# Patient Record
Sex: FEMALE | Race: WHITE | NOT HISPANIC OR LATINO | Employment: OTHER | URBAN - METROPOLITAN AREA
[De-identification: names, ages, dates, MRNs, and addresses within clinical notes are randomized per-mention and may not be internally consistent; named-entity substitution may affect disease eponyms.]

---

## 2023-11-15 ENCOUNTER — APPOINTMENT (OUTPATIENT)
Dept: HEMATOLOGY/ONCOLOGY | Facility: HOSPITAL | Age: 68
End: 2023-11-15
Payer: MEDICARE

## 2024-01-04 ENCOUNTER — TELEPHONE (OUTPATIENT)
Dept: HEMATOLOGY/ONCOLOGY | Facility: HOSPITAL | Age: 69
End: 2024-01-04
Payer: MEDICARE

## 2024-01-04 ENCOUNTER — ANCILLARY PROCEDURE (OUTPATIENT)
Dept: RADIOLOGY | Facility: CLINIC | Age: 69
End: 2024-01-04
Payer: MEDICARE

## 2024-01-04 DIAGNOSIS — C50.919 MALIGNANT NEOPLASM OF UNSPECIFIED SITE OF UNSPECIFIED FEMALE BREAST (MULTI): ICD-10-CM

## 2024-01-04 PROCEDURE — G0279 TOMOSYNTHESIS, MAMMO: HCPCS | Performed by: RADIOLOGY

## 2024-01-04 PROCEDURE — 77066 DX MAMMO INCL CAD BI: CPT | Performed by: RADIOLOGY

## 2024-01-04 PROCEDURE — 77062 BREAST TOMOSYNTHESIS BI: CPT

## 2024-01-04 NOTE — TELEPHONE ENCOUNTER
Called patient to review mammogram results. If patient returns call, please inform mammogram results are normal and recommendation is to repeat in 1 year.

## 2024-01-04 NOTE — TELEPHONE ENCOUNTER
Pt returned call- notified mamm results were normal and recommendation is to repeat in one year. No further questions.

## 2024-03-21 ENCOUNTER — APPOINTMENT (OUTPATIENT)
Dept: SURGICAL ONCOLOGY | Facility: CLINIC | Age: 69
End: 2024-03-21
Payer: MEDICARE

## 2024-04-09 ENCOUNTER — OFFICE VISIT (OUTPATIENT)
Dept: PRIMARY CARE | Facility: CLINIC | Age: 69
End: 2024-04-09
Payer: MEDICARE

## 2024-04-09 VITALS
OXYGEN SATURATION: 95 % | BODY MASS INDEX: 40.25 KG/M2 | SYSTOLIC BLOOD PRESSURE: 136 MMHG | DIASTOLIC BLOOD PRESSURE: 80 MMHG | TEMPERATURE: 98.6 F | HEIGHT: 65 IN | WEIGHT: 241.6 LBS | HEART RATE: 60 BPM

## 2024-04-09 DIAGNOSIS — M85.80 OSTEOPENIA DETERMINED BY X-RAY: ICD-10-CM

## 2024-04-09 DIAGNOSIS — K76.89 LIVER CYST: ICD-10-CM

## 2024-04-09 DIAGNOSIS — Z11.59 NEED FOR HEPATITIS C SCREENING TEST: ICD-10-CM

## 2024-04-09 DIAGNOSIS — Z78.0 POST-MENOPAUSAL: ICD-10-CM

## 2024-04-09 DIAGNOSIS — E78.00 HYPERCHOLESTEROLEMIA: ICD-10-CM

## 2024-04-09 DIAGNOSIS — I50.32 CHRONIC DIASTOLIC HEART FAILURE (MULTI): ICD-10-CM

## 2024-04-09 DIAGNOSIS — I48.19 PERSISTENT ATRIAL FIBRILLATION (MULTI): ICD-10-CM

## 2024-04-09 DIAGNOSIS — Z76.89 ENCOUNTER TO ESTABLISH CARE WITH NEW DOCTOR: Primary | ICD-10-CM

## 2024-04-09 DIAGNOSIS — R53.82 CHRONIC FATIGUE: ICD-10-CM

## 2024-04-09 DIAGNOSIS — R73.03 PREDIABETES: ICD-10-CM

## 2024-04-09 DIAGNOSIS — M79.10 MYALGIA: ICD-10-CM

## 2024-04-09 PROBLEM — I10 HYPERTENSION: Status: ACTIVE | Noted: 2024-04-09

## 2024-04-09 PROBLEM — I25.118 ATHEROSCLEROTIC HEART DISEASE OF NATIVE CORONARY ARTERY WITH OTHER FORMS OF ANGINA PECTORIS (CMS-HCC): Status: ACTIVE | Noted: 2024-04-09

## 2024-04-09 PROBLEM — K82.8 SLUDGE IN GALLBLADDER: Status: RESOLVED | Noted: 2023-08-08 | Resolved: 2024-04-09

## 2024-04-09 PROBLEM — Z96.659 HISTORY OF TOTAL KNEE REPLACEMENT: Status: RESOLVED | Noted: 2019-10-21 | Resolved: 2024-04-09

## 2024-04-09 PROBLEM — Z86.010 HISTORY OF COLON POLYPS: Status: RESOLVED | Noted: 2024-04-09 | Resolved: 2024-04-09

## 2024-04-09 PROBLEM — K82.4 GALL BLADDER POLYP: Status: RESOLVED | Noted: 2024-04-09 | Resolved: 2024-04-09

## 2024-04-09 PROBLEM — Z86.0100 HISTORY OF COLON POLYPS: Status: RESOLVED | Noted: 2024-04-09 | Resolved: 2024-04-09

## 2024-04-09 PROCEDURE — 99214 OFFICE O/P EST MOD 30 MIN: CPT | Performed by: FAMILY MEDICINE

## 2024-04-09 PROCEDURE — 1160F RVW MEDS BY RX/DR IN RCRD: CPT | Performed by: FAMILY MEDICINE

## 2024-04-09 PROCEDURE — 3079F DIAST BP 80-89 MM HG: CPT | Performed by: FAMILY MEDICINE

## 2024-04-09 PROCEDURE — 3075F SYST BP GE 130 - 139MM HG: CPT | Performed by: FAMILY MEDICINE

## 2024-04-09 PROCEDURE — 99204 OFFICE O/P NEW MOD 45 MIN: CPT | Performed by: FAMILY MEDICINE

## 2024-04-09 PROCEDURE — 3008F BODY MASS INDEX DOCD: CPT | Performed by: FAMILY MEDICINE

## 2024-04-09 PROCEDURE — 1159F MED LIST DOCD IN RCRD: CPT | Performed by: FAMILY MEDICINE

## 2024-04-09 PROCEDURE — 1126F AMNT PAIN NOTED NONE PRSNT: CPT | Performed by: FAMILY MEDICINE

## 2024-04-09 RX ORDER — POTASSIUM CHLORIDE 20 MEQ/1
20 TABLET, EXTENDED RELEASE ORAL
COMMUNITY

## 2024-04-09 RX ORDER — FUROSEMIDE 20 MG/1
20 TABLET ORAL
COMMUNITY
Start: 2023-07-05

## 2024-04-09 RX ORDER — ANASTROZOLE 1 MG/1
1 TABLET ORAL DAILY
COMMUNITY

## 2024-04-09 RX ORDER — RIVAROXABAN 20 MG/1
20 TABLET, FILM COATED ORAL
COMMUNITY

## 2024-04-09 RX ORDER — SIMVASTATIN 20 MG/1
20 TABLET, FILM COATED ORAL NIGHTLY
Qty: 90 TABLET | Refills: 0 | Status: SHIPPED | OUTPATIENT
Start: 2024-04-09 | End: 2024-06-05 | Stop reason: SDUPTHER

## 2024-04-09 RX ORDER — ACETAMINOPHEN 500 MG
5000 TABLET ORAL
COMMUNITY
End: 2024-06-05 | Stop reason: ALTCHOICE

## 2024-04-09 RX ORDER — DOFETILIDE 0.25 MG/1
500 CAPSULE ORAL 2 TIMES DAILY
COMMUNITY

## 2024-04-09 RX ORDER — ATORVASTATIN CALCIUM 20 MG/1
20 TABLET, FILM COATED ORAL NIGHTLY
COMMUNITY
Start: 2023-06-01 | End: 2024-04-09 | Stop reason: SINTOL

## 2024-04-09 ASSESSMENT — LIFESTYLE VARIABLES
SKIP TO QUESTIONS 9-10: 1
HOW OFTEN DO YOU HAVE SIX OR MORE DRINKS ON ONE OCCASION: NEVER
AUDIT-C TOTAL SCORE: 1
HOW OFTEN DO YOU HAVE A DRINK CONTAINING ALCOHOL: MONTHLY OR LESS
HOW MANY STANDARD DRINKS CONTAINING ALCOHOL DO YOU HAVE ON A TYPICAL DAY: 1 OR 2

## 2024-04-09 ASSESSMENT — PATIENT HEALTH QUESTIONNAIRE - PHQ9
2. FEELING DOWN, DEPRESSED OR HOPELESS: NOT AT ALL
SUM OF ALL RESPONSES TO PHQ9 QUESTIONS 1 AND 2: 0
1. LITTLE INTEREST OR PLEASURE IN DOING THINGS: NOT AT ALL

## 2024-04-09 ASSESSMENT — PAIN SCALES - GENERAL: PAINLEVEL: 0-NO PAIN

## 2024-04-09 NOTE — PATIENT INSTRUCTIONS
Problem List Items Addressed This Visit             ICD-10-CM    Chronic diastolic heart failure (CMS/HCC) I50.32    Relevant Orders    Referral to Cardiology    Hypercholesterolemia E78.00    Relevant Medications    simvastatin (Zocor) 20 mg tablet    Other Relevant Orders    Lipid Panel    Comprehensive Metabolic Panel    Persistent atrial fibrillation (CMS/HCC) I48.19    Relevant Orders    Referral to Cardiology    Prediabetes R73.03    Relevant Orders    Insulin, Fasting    Lipid Panel    Comprehensive Metabolic Panel     Other Visit Diagnoses         Codes    Encounter to establish care with new doctor    -  Primary Z76.89    Liver cyst     K76.89    Relevant Orders    Referral to Gastroenterology    Comprehensive Metabolic Panel    Osteopenia determined by x-ray     M85.80    Relevant Orders    XR DEXA bone density    Post-menopausal     Z78.0    Relevant Orders    XR DEXA bone density    Myalgia     M79.10    Relevant Orders    Vitamin D 25-Hydroxy,Total (for eval of Vitamin D levels)    Vitamin B12    Chronic fatigue     R53.82    Relevant Orders    Vitamin D 25-Hydroxy,Total (for eval of Vitamin D levels)    Vitamin B12    Body mass index (BMI) 40.0-44.9, adult (CMS/[facility])     Z68.41    Relevant Orders    Vitamin D 25-Hydroxy,Total (for eval of Vitamin D levels)    Need for hepatitis C screening test     Z11.59    Relevant Orders    Hepatitis C antibody            Additional Visit Plans:  Plan to stop atorvastatin in favor of trying simvastatin as you were getting some cramping on atorvastatin. Recheck fasting labs in 6-8 weeks. Lets see how your cholesterol dose. Focus on a low fat diet with regular exercise. Will also check fasting insulin level as I suspect yours might be high.     Plan to transition to cardiology in our system.     Plan to see Dr. Garcia, the GI doctor, about your liver cyst/history thereof and future scoping.     Next Wellness Exam/Annual Physical Due  At your earliest  convenience.    Patient Care Team:  Ayden Yanez DO as PCP - General (Family Medicine)    Ayden Yanez DO   04/09/24   10:54 AM

## 2024-04-09 NOTE — PROGRESS NOTES
"         Outpatient Visit Note    Chief Complaint   Patient presents with    Establish Care     NP EST Care- Medication refills       HPI:  Betsey Flor is a 68 y.o. female here to establish care and go over her medications.    Previous PCP is from [facility]. Moving to the  system.     She sees cardiology and heme-onc. Has chronic Afib, s/p 3x ablations and several cardioversions. On Tokisyn now.     History of breast cancer, surgically removed 2 years ago. Ductal carcinoma. Has radiation. Sees Dr. Hernandez and Medical Oncology at . Follows with them every 6 months.    Liver cyst that was \"deroofed\" when she had her gallbladder removed by the surgeon. Does not see GI. No more abdominal pain.     Prediabetes with glucose control concerns. No Fhx diabetes. On Atorvastatin. A1c was 5.7% last month. Was out of her cholesterol medicine for 6 weeks when she did her labs.     PHQ9/GAD7:         Past Medical History:   Diagnosis Date    Cancer (CMS/HCC)     Gall bladder polyp 04/09/2024    Heart disease     History of colon polyps 04/09/2024    History of radiation therapy     left breast    History of total knee replacement 10/21/2019    Liver cyst 10/20/2020    Last Assessment & Plan: Formatting of this note might be different from the original. Assessment:Was told at time of echo she had a large liver cyst but it was \" nothing to worry about \" however she did have dedicated liver US recently ,results not known        Current Medications  Current Outpatient Medications   Medication Instructions    anastrozole (ARIMIDEX) 1 mg, oral, Daily    BIOTIN ORAL 1 tablet, oral, Daily    CALCIUM ORAL 1 tablet, oral, Daily    cholecalciferol (VITAMIN D-3) 5,000 Units, oral    dofetilide (TIKOSYN) 500 mcg, oral, 2 times daily    furosemide (LASIX) 20 mg, oral, Daily RT    potassium chloride CR 20 mEq ER tablet 20 mEq, oral, Every 48 hours    simvastatin (ZOCOR) 20 mg, oral, Nightly    Xarelto 20 mg, oral, Daily with evening meal    "     Allergies  No Known Allergies     Past Surgical History:   Procedure Laterality Date    BREAST SURGERY      CHOLECYSTECTOMY      HYSTERECTOMY      OTHER SURGICAL HISTORY  10/08/2019    Knee replacement    OTHER SURGICAL HISTORY  10/08/2019    Knee arthroscopy     Family History   Problem Relation Name Age of Onset    Atrial fibrillation Mother My dad     Cancer Mother My dad     Ovarian cancer Mother My dad      Social History     Tobacco Use    Smoking status: Never    Smokeless tobacco: Never   Vaping Use    Vaping status: Never Used   Substance Use Topics    Alcohol use: Not Currently     Comment: socially    Drug use: Never     Tobacco Use: Low Risk  (4/9/2024)    Patient History     Smoking Tobacco Use: Never     Smokeless Tobacco Use: Never     Passive Exposure: Not on file        ROS  All pertinent positive symptoms are included in the history of present illness.  All other systems have been reviewed and are negative and noncontributory to this patient's current ailments.    VITAL SIGNS  Vitals:    04/09/24 1021   BP: 136/80   Pulse: 60   Temp: 37 °C (98.6 °F)   SpO2: 95%     Vitals:    04/09/24 1021   Weight: 110 kg (241 lb 9.6 oz)      Body mass index is 40.2 kg/m².     PHYSICAL EXAM  GENERAL APPEARANCE: well nourished, well developed, looks like stated age, in no acute distress, not ill or tired appearing, conversing well.   HEENT: no trauma, normocephalic.   NECK: supple without rigidity, no neck mass was observed.   LUNGS: good chest wall expansion. In no acute respiratory distress.   EXTREMITIES: moving all extremities equally with no edema.   SKIN: normal skin color and pigmentation, without rash.   NEUROLOGIC EXAM: CN II-XII grossly intact, normal gait.   PSYCH: mood and affect appropriate; alert and oriented to time, place, person; no difficulty with speech or language.     Counseling:       Medication education:           Education:  The patient is counseled regarding potential side-effects of  all new medications          Understanding:  Patient expressed understanding of information conveyed today          Adherence:  No barriers to adherence identified     Assessment/Plan   Problem List Items Addressed This Visit             ICD-10-CM    Chronic diastolic heart failure (CMS/[facility]) I50.32    Relevant Orders    Referral to Cardiology    Hypercholesterolemia E78.00    Relevant Medications    simvastatin (Zocor) 20 mg tablet    Other Relevant Orders    Lipid Panel    Comprehensive Metabolic Panel    Persistent atrial fibrillation (CMS/[facility]) I48.19    Relevant Orders    Referral to Cardiology    Prediabetes R73.03    Relevant Orders    Insulin, Fasting    Lipid Panel    Comprehensive Metabolic Panel     Other Visit Diagnoses         Codes    Encounter to establish care with new doctor    -  Primary Z76.89    Liver cyst     K76.89    Relevant Orders    Referral to Gastroenterology    Comprehensive Metabolic Panel    Osteopenia determined by x-ray     M85.80    Relevant Orders    XR DEXA bone density    Post-menopausal     Z78.0    Relevant Orders    XR DEXA bone density    Myalgia     M79.10    Relevant Orders    Vitamin D 25-Hydroxy,Total (for eval of Vitamin D levels)    Vitamin B12    Chronic fatigue     R53.82    Relevant Orders    Vitamin D 25-Hydroxy,Total (for eval of Vitamin D levels)    Vitamin B12    Body mass index (BMI) 40.0-44.9, adult (CMS/[facility])     Z68.41    Relevant Orders    Vitamin D 25-Hydroxy,Total (for eval of Vitamin D levels)    Need for hepatitis C screening test     Z11.59    Relevant Orders    Hepatitis C antibody            Additional Visit Plans:  Plan to stop atorvastatin in favor of trying simvastatin as you were getting some cramping on atorvastatin. Recheck fasting labs in 6-8 weeks. Lets see how your cholesterol dose. Focus on a low fat diet with regular exercise. Will also check fasting insulin level as I suspect yours might be high.     Plan to transition to cardiology in our  system.     Plan to see Dr. Garcia, the GI doctor, about your liver cyst/history thereof and future scoping.     Next Wellness Exam/Annual Physical Due  At your earliest convenience.    Patient Care Team:  Ayden Yanez DO as PCP - General (Family Medicine)    Ayden Yanez DO   04/11/24   7:47 AM

## 2024-04-15 ENCOUNTER — APPOINTMENT (OUTPATIENT)
Dept: RADIOLOGY | Facility: CLINIC | Age: 69
End: 2024-04-15
Payer: MEDICARE

## 2024-05-03 ENCOUNTER — HOSPITAL ENCOUNTER (OUTPATIENT)
Dept: RADIOLOGY | Facility: HOSPITAL | Age: 69
Discharge: HOME | End: 2024-05-03
Payer: MEDICARE

## 2024-05-03 DIAGNOSIS — K52.9 NONINFECTIVE GASTROENTERITIS AND COLITIS, UNSPECIFIED: ICD-10-CM

## 2024-05-03 PROCEDURE — 76705 ECHO EXAM OF ABDOMEN: CPT

## 2024-05-03 PROCEDURE — 76705 ECHO EXAM OF ABDOMEN: CPT | Performed by: STUDENT IN AN ORGANIZED HEALTH CARE EDUCATION/TRAINING PROGRAM

## 2024-05-07 ENCOUNTER — HOSPITAL ENCOUNTER (OUTPATIENT)
Dept: RADIOLOGY | Facility: HOSPITAL | Age: 69
Discharge: HOME | End: 2024-05-07
Payer: MEDICARE

## 2024-05-07 ENCOUNTER — LAB (OUTPATIENT)
Dept: LAB | Facility: LAB | Age: 69
End: 2024-05-07
Payer: MEDICARE

## 2024-05-07 DIAGNOSIS — E78.00 HYPERCHOLESTEROLEMIA: ICD-10-CM

## 2024-05-07 DIAGNOSIS — R53.82 CHRONIC FATIGUE: ICD-10-CM

## 2024-05-07 DIAGNOSIS — Z11.59 NEED FOR HEPATITIS C SCREENING TEST: ICD-10-CM

## 2024-05-07 DIAGNOSIS — M79.10 MYALGIA: ICD-10-CM

## 2024-05-07 DIAGNOSIS — Z78.0 POST-MENOPAUSAL: ICD-10-CM

## 2024-05-07 DIAGNOSIS — K76.89 LIVER CYST: ICD-10-CM

## 2024-05-07 DIAGNOSIS — R73.03 PREDIABETES: ICD-10-CM

## 2024-05-07 DIAGNOSIS — M85.80 OSTEOPENIA DETERMINED BY X-RAY: ICD-10-CM

## 2024-05-07 LAB
25(OH)D3 SERPL-MCNC: 22 NG/ML (ref 31–100)
ALBUMIN SERPL-MCNC: 4.4 G/DL (ref 3.5–5)
ALP BLD-CCNC: 67 U/L (ref 35–125)
ALT SERPL-CCNC: 23 U/L (ref 5–40)
ANION GAP SERPL CALC-SCNC: 12 MMOL/L
AST SERPL-CCNC: 20 U/L (ref 5–40)
BILIRUB SERPL-MCNC: 0.7 MG/DL (ref 0.1–1.2)
BUN SERPL-MCNC: 11 MG/DL (ref 8–25)
CALCIUM SERPL-MCNC: 9.2 MG/DL (ref 8.5–10.4)
CHLORIDE SERPL-SCNC: 105 MMOL/L (ref 97–107)
CHOLEST SERPL-MCNC: 145 MG/DL (ref 133–200)
CHOLEST/HDLC SERPL: 3 {RATIO}
CO2 SERPL-SCNC: 26 MMOL/L (ref 24–31)
CREAT SERPL-MCNC: 0.7 MG/DL (ref 0.4–1.6)
EGFRCR SERPLBLD CKD-EPI 2021: >90 ML/MIN/1.73M*2
GLUCOSE SERPL-MCNC: 95 MG/DL (ref 65–99)
HCV AB SER QL: NONREACTIVE
HDLC SERPL-MCNC: 49 MG/DL
INSULIN P FAST SERPL-ACNC: 20 UIU/ML (ref 3–25)
LDLC SERPL CALC-MCNC: 73 MG/DL (ref 65–130)
POTASSIUM SERPL-SCNC: 4.4 MMOL/L (ref 3.4–5.1)
PROT SERPL-MCNC: 6.7 G/DL (ref 5.9–7.9)
SODIUM SERPL-SCNC: 143 MMOL/L (ref 133–145)
TRIGL SERPL-MCNC: 116 MG/DL (ref 40–150)
VIT B12 SERPL-MCNC: 599 PG/ML (ref 211–946)

## 2024-05-07 PROCEDURE — 77080 DXA BONE DENSITY AXIAL: CPT

## 2024-05-07 PROCEDURE — 80061 LIPID PANEL: CPT

## 2024-05-07 PROCEDURE — 36415 COLL VENOUS BLD VENIPUNCTURE: CPT

## 2024-05-07 PROCEDURE — 83525 ASSAY OF INSULIN: CPT

## 2024-05-07 PROCEDURE — 86803 HEPATITIS C AB TEST: CPT

## 2024-05-07 PROCEDURE — 82306 VITAMIN D 25 HYDROXY: CPT

## 2024-05-07 PROCEDURE — 80053 COMPREHEN METABOLIC PANEL: CPT

## 2024-05-07 PROCEDURE — 82607 VITAMIN B-12: CPT

## 2024-05-21 ENCOUNTER — APPOINTMENT (OUTPATIENT)
Dept: PRIMARY CARE | Facility: CLINIC | Age: 69
End: 2024-05-21
Payer: MEDICARE

## 2024-05-28 DIAGNOSIS — E88.819 INSULIN RESISTANCE: ICD-10-CM

## 2024-05-28 DIAGNOSIS — E55.9 VITAMIN D DEFICIENCY: Primary | ICD-10-CM

## 2024-05-28 RX ORDER — CHOLECALCIFEROL (VITAMIN D3) 1250 MCG
50000 TABLET ORAL
Qty: 12 TABLET | Refills: 0 | Status: SHIPPED | OUTPATIENT
Start: 2024-06-02 | End: 2024-08-31

## 2024-06-05 ENCOUNTER — OFFICE VISIT (OUTPATIENT)
Dept: PRIMARY CARE | Facility: CLINIC | Age: 69
End: 2024-06-05
Payer: MEDICARE

## 2024-06-05 VITALS
TEMPERATURE: 97.9 F | OXYGEN SATURATION: 95 % | SYSTOLIC BLOOD PRESSURE: 136 MMHG | WEIGHT: 235.4 LBS | HEIGHT: 64 IN | DIASTOLIC BLOOD PRESSURE: 86 MMHG | BODY MASS INDEX: 40.19 KG/M2 | RESPIRATION RATE: 18 BRPM | HEART RATE: 67 BPM

## 2024-06-05 DIAGNOSIS — Z00.00 MEDICARE ANNUAL WELLNESS VISIT, SUBSEQUENT: Primary | ICD-10-CM

## 2024-06-05 DIAGNOSIS — Z12.31 BREAST CANCER SCREENING BY MAMMOGRAM: ICD-10-CM

## 2024-06-05 DIAGNOSIS — E78.00 HYPERCHOLESTEROLEMIA: ICD-10-CM

## 2024-06-05 DIAGNOSIS — R73.03 PREDIABETES: ICD-10-CM

## 2024-06-05 LAB — POC HEMOGLOBIN A1C: 5.6 % (ref 4.2–6.5)

## 2024-06-05 PROCEDURE — G0439 PPPS, SUBSEQ VISIT: HCPCS | Performed by: FAMILY MEDICINE

## 2024-06-05 PROCEDURE — 1126F AMNT PAIN NOTED NONE PRSNT: CPT | Performed by: FAMILY MEDICINE

## 2024-06-05 PROCEDURE — 1158F ADVNC CARE PLAN TLK DOCD: CPT | Performed by: FAMILY MEDICINE

## 2024-06-05 PROCEDURE — 99213 OFFICE O/P EST LOW 20 MIN: CPT | Performed by: FAMILY MEDICINE

## 2024-06-05 PROCEDURE — 83036 HEMOGLOBIN GLYCOSYLATED A1C: CPT | Mod: MUE | Performed by: FAMILY MEDICINE

## 2024-06-05 PROCEDURE — 3008F BODY MASS INDEX DOCD: CPT | Performed by: FAMILY MEDICINE

## 2024-06-05 PROCEDURE — 1123F ACP DISCUSS/DSCN MKR DOCD: CPT | Performed by: FAMILY MEDICINE

## 2024-06-05 PROCEDURE — 1170F FXNL STATUS ASSESSED: CPT | Performed by: FAMILY MEDICINE

## 2024-06-05 PROCEDURE — 3075F SYST BP GE 130 - 139MM HG: CPT | Performed by: FAMILY MEDICINE

## 2024-06-05 PROCEDURE — 1160F RVW MEDS BY RX/DR IN RCRD: CPT | Performed by: FAMILY MEDICINE

## 2024-06-05 PROCEDURE — 99215 OFFICE O/P EST HI 40 MIN: CPT | Performed by: FAMILY MEDICINE

## 2024-06-05 PROCEDURE — 3079F DIAST BP 80-89 MM HG: CPT | Performed by: FAMILY MEDICINE

## 2024-06-05 PROCEDURE — 1159F MED LIST DOCD IN RCRD: CPT | Performed by: FAMILY MEDICINE

## 2024-06-05 RX ORDER — COLESEVELAM 180 1/1
1250 TABLET ORAL 2 TIMES DAILY
COMMUNITY
Start: 2024-05-29 | End: 2024-06-28

## 2024-06-05 RX ORDER — SIMVASTATIN 20 MG/1
20 TABLET, FILM COATED ORAL NIGHTLY
Qty: 90 TABLET | Refills: 1 | Status: SHIPPED | OUTPATIENT
Start: 2024-06-05 | End: 2024-12-02

## 2024-06-05 ASSESSMENT — ENCOUNTER SYMPTOMS
LOSS OF SENSATION IN FEET: 0
OCCASIONAL FEELINGS OF UNSTEADINESS: 0
DEPRESSION: 0

## 2024-06-05 ASSESSMENT — PATIENT HEALTH QUESTIONNAIRE - PHQ9
SUM OF ALL RESPONSES TO PHQ9 QUESTIONS 1 AND 2: 0
1. LITTLE INTEREST OR PLEASURE IN DOING THINGS: NOT AT ALL
2. FEELING DOWN, DEPRESSED OR HOPELESS: NOT AT ALL

## 2024-06-05 ASSESSMENT — ACTIVITIES OF DAILY LIVING (ADL)
DRESSING: INDEPENDENT
BATHING: INDEPENDENT

## 2024-06-05 ASSESSMENT — COLUMBIA-SUICIDE SEVERITY RATING SCALE - C-SSRS
2. HAVE YOU ACTUALLY HAD ANY THOUGHTS OF KILLING YOURSELF?: NO
1. IN THE PAST MONTH, HAVE YOU WISHED YOU WERE DEAD OR WISHED YOU COULD GO TO SLEEP AND NOT WAKE UP?: NO
6. HAVE YOU EVER DONE ANYTHING, STARTED TO DO ANYTHING, OR PREPARED TO DO ANYTHING TO END YOUR LIFE?: NO

## 2024-06-05 ASSESSMENT — PAIN SCALES - GENERAL: PAINLEVEL: 0-NO PAIN

## 2024-06-05 NOTE — PROGRESS NOTES
Outpatient Visit Note    Chief Complaint   Patient presents with    Annual Exam       HPI:  Betsey Flor is a 68 y.o. female here  for a Medicare Wellness Visit. Also needs a refill on her medicines.     No side effects or cramping on Simvastatin. Had labs done. Needs refill.     Health Maintenance  Immunizations: Tdap is due 2027.  Pneumonia vaccine is up-to-date.  Shingles vaccination is due.    Denies smoking or illicit drug use, drinks 0 alcoholic beverages a week. Patient reports routine vision checks and dental cleanings, and regular exercise with going to the gym for water aerobics.  She recently had routine blood work done in preparation for today's visit.  Is on vitamin D supplementation now    Screening colonoscopy done in 2023, having another one done next month. Screening pap N/A. Screening mammogram is due soon. DEXA done recently, next due 2026    Otherwise denies fevers, chills, cold/flu symptoms, SOB, CP, N/V, abdominal pain, dysuria, hematuria, melena, diarrhea or LE edema     Advanced directives: in place, not on file here    Living Will: Not Received    Healthcare Power of Atty: Not Received     Hearing screen: reports no difficulty with hearing and passes finger rub test bilaterally    Does the patient use opioid medications: No    How does the patient rate their health status today: good    Cognitive Screen:  AAAx3  to person, place and time: Yes  3 word recall: Apple, Car, Shoe - Immediate recall: Yes         - 5 minutes recall: Yes   Impression: No cognitive deficiency observed during screening or encounter today    Reviewed:   Past Medical History/Allergies:  Yes  Family History:  Yes  Social History:  Yes  Current Medications:  Yes  Vital Signs:  Yes  Advanced Directives:  discussed  Immunizations:  reviewed today  Home Safety:                    Up & Go test > 30 seconds?  No                   Home have rugs; lack grab bars in bathroom; lack handrail on stairs; have poor  "lighting?  No                   Hearing difficulties?  No  Geriatric Assessment                   ADL areas requiring assistance:  Does not need help with Dressing, Eating, Ambulating, Toileting, Grooming, Hygiene.                    IADL areas requiring assistance:  Does not need help with Shopping, Housework, Accounting, Transportation, Driving.   Medications: reviewed  Current supplements:  Reviewed and recorded.   Other providers: Reviewed and recorded - Current providers and suppliers: Dr. Yanez, Dr. Garcia, Dr. Bell, Dr. Cutler, Ladan Coronel CNP          PHQ9/GAD7:         Patient Active Problem List    Diagnosis Date Noted    Vitamin D deficiency 05/28/2024    Insulin resistance 05/28/2024    Atherosclerotic heart disease of native coronary artery with other forms of angina pectoris (CMS-HCC) 04/09/2024    Hypertension 04/09/2024    Prediabetes 07/26/2023    Chronic diastolic heart failure (Multi) 04/07/2022    Persistent atrial fibrillation (Multi) 04/05/2022    Primary osteoarthritis of left knee 01/26/2016    Hypercholesterolemia 11/11/2015    Actinic keratosis 06/07/2013        Past Medical History:   Diagnosis Date    Cancer (Multi)     Gall bladder polyp 04/09/2024    Heart disease     History of colon polyps 04/09/2024    History of radiation therapy     left breast    History of total knee replacement 10/21/2019    Liver cyst 10/20/2020    Last Assessment & Plan: Formatting of this note might be different from the original. Assessment:Was told at time of echo she had a large liver cyst but it was \" nothing to worry about \" however she did have dedicated liver US recently ,results not known        Current Medications  Current Outpatient Medications   Medication Instructions    anastrozole (ARIMIDEX) 1 mg, oral, Daily    BIOTIN ORAL 1 tablet, oral, Daily    CALCIUM ORAL 1 tablet, oral, Daily    cholecalciferol (VITAMIN D3) 50,000 Units, oral, Once Weekly    colesevelam (WELCHOL) 1,250 mg, oral, " 2 times daily    dofetilide (TIKOSYN) 500 mcg, oral, 2 times daily    furosemide (LASIX) 20 mg, oral, Daily RT    potassium chloride CR 20 mEq ER tablet 20 mEq, oral, Every 48 hours    simvastatin (ZOCOR) 20 mg, oral, Nightly    Xarelto 20 mg, oral, Daily with evening meal        Allergies  No Known Allergies     Immunizations  Immunization History   Administered Date(s) Administered    Flu vaccine, quadrivalent, high-dose, preservative free, age 65y+ (FLUZONE) 10/29/2020, 01/07/2022, 11/29/2022    Influenza, High Dose Seasonal, Preservative Free 11/01/2019    Influenza, injectable, quadrivalent 09/29/2015    Moderna SARS-CoV-2 Vaccination 03/10/2021, 04/09/2021, 12/20/2021    Pneumococcal polysaccharide vaccine, 23-valent, age 2 years and older (PNEUMOVAX 23) 02/06/2020    Tdap vaccine, age 7 year and older (BOOSTRIX, ADACEL) 01/01/2017        Past Surgical History:   Procedure Laterality Date    BREAST SURGERY      CHOLECYSTECTOMY      HYSTERECTOMY      OTHER SURGICAL HISTORY  10/08/2019    Knee replacement    OTHER SURGICAL HISTORY  10/08/2019    Knee arthroscopy     Family History   Problem Relation Name Age of Onset    Atrial fibrillation Mother My dad     Cancer Mother My dad     Ovarian cancer Mother My dad      Social History     Tobacco Use    Smoking status: Never    Smokeless tobacco: Never   Vaping Use    Vaping status: Never Used   Substance Use Topics    Alcohol use: Not Currently     Comment: socially    Drug use: Never     Tobacco Use: Low Risk  (6/5/2024)    Patient History     Smoking Tobacco Use: Never     Smokeless Tobacco Use: Never     Passive Exposure: Not on file        ROS  All pertinent positive symptoms are included in the history of present illness.  All other systems have been reviewed and are negative and noncontributory to this patient's current ailments.    VITAL SIGNS  Vitals:    06/05/24 1558   BP: 136/86   Pulse: 67   Resp: 18   Temp: 36.6 °C (97.9 °F)   SpO2: 95%     Vitals:     06/05/24 1558   Weight: 107 kg (235 lb 6.4 oz)      Body mass index is 40.41 kg/m².     PHYSICAL EXAM  GENERAL APPEARANCE: well nourished, well developed, looks like stated age, in no acute distress, not ill or tired appearing, conversing well.   HEENT: no trauma, normocephalic.   NECK: supple without rigidity, no neck mass was observed.   LUNGS: good chest wall expansion. In no acute respiratory distress.   EXTREMITIES: moving all extremities equally with no edema.   SKIN: normal skin color and pigmentation, without rash.   NEUROLOGIC EXAM: CN II-XII grossly intact, normal gait.   PSYCH: mood and affect appropriate; alert and oriented to time, place, person; no difficulty with speech or language.       Preventative Services reviewed with patient and copy printed for patient.    Assessment/Plan   Problem List Items Addressed This Visit             ICD-10-CM    Hypercholesterolemia E78.00    Relevant Medications    simvastatin (Zocor) 20 mg tablet    Prediabetes R73.03    Relevant Orders    POCT glycosylated hemoglobin (Hb A1C) manually resulted (Completed)     Other Visit Diagnoses         Codes    Medicare annual wellness visit, subsequent    -  Primary Z00.00    Breast cancer screening by mammogram     Z12.31    Relevant Orders    BI mammo bilateral screening tomosynthesis            Additional Visit Plans:  Notes:  PREVENTATIVE HEALTH SCREENINGS INCLUDED:  - Blood pressure screen.  - Blood work may include a cholesterol and diabetes screen if risk factors exist (overweight, high blood pressure etc); screening for sexually transmitted infections; a one time HIV screen for all individuals, and Hepatitis C Virus screening  - I encourage you to eat a low-fat, moderate-carbohydrate, low-calorie diet to maintain a normal BMI (under 25) to reduce heart disease, and risk for diabetes  - Moderate intensity exercise for 30 minutes 5 days per week is recommended  - Helpful resources recommended by the American Academy of  Family Practice can be found at www.familydoctor.org or www.choosemyplate.gov  - Can also consider enrolling in a program such as Weight Watchers or [facility]. The most effective diet is going to be one you can follow long term and turn into a lifestyle. The CDC recommends losing 0.5-2 lbs a week if overweight or obese.  - I recommend a routine vision check and dental cleanings every 6 months    - Colon cancer screening for all ages 45-75 or 85 years old periodically depending on results. Repeat next month  - Cervical cancer screening (pap test) in women between 21-65 years old, periodically depending on results. N/A  - Mammogram screening for breast cancer in women starting at 40-50 years and every 1-2 years. Due soon  - Bone density screening (DEXA) for osteoporosis in women aged 65 years and older, once every two years if needed. Plan for repeat 2026    IMMUNIZATIONS:  - Flu shot annually.  - Tetanus booster every 10 years. Tdap is due 2027.  - Two pneumococcal vaccinations after 65 years old. Up to date  - Shingles vaccine for those 50 years or older. Due at this time, check pricing at the pharmacy    This was a shared decision making visit.    Next Wellness Exam Due  In 1 year from today      Ayden Yanez DO   06/12/24   10:13 AM

## 2024-06-05 NOTE — PATIENT INSTRUCTIONS
Problem List Items Addressed This Visit             ICD-10-CM    Hypercholesterolemia E78.00    Relevant Medications    simvastatin (Zocor) 20 mg tablet    Prediabetes R73.03    Relevant Orders    POCT glycosylated hemoglobin (Hb A1C) manually resulted     Other Visit Diagnoses         Codes    Medicare annual wellness visit, subsequent    -  Primary Z00.00    Breast cancer screening by mammogram     Z12.31    Relevant Orders    BI mammo bilateral screening tomosynthesis            Additional Visit Plans:  Notes:  PREVENTATIVE HEALTH SCREENINGS INCLUDED:  - Blood pressure screen.  - Blood work may include a cholesterol and diabetes screen if risk factors exist (overweight, high blood pressure etc); screening for sexually transmitted infections; a one time HIV screen for all individuals, and Hepatitis C Virus screening  - I encourage you to eat a low-fat, moderate-carbohydrate, low-calorie diet to maintain a normal BMI (under 25) to reduce heart disease, and risk for diabetes  - Moderate intensity exercise for 30 minutes 5 days per week is recommended  - Helpful resources recommended by the American Academy of Family Practice can be found at www.familydoctor.org or www.choosemyplate.gov  - Can also consider enrolling in a program such as Weight Watchers or [employer]. The most effective diet is going to be one you can follow long term and turn into a lifestyle. The CDC recommends losing 0.5-2 lbs a week if overweight or obese.  - I recommend a routine vision check and dental cleanings every 6 months    - Colon cancer screening for all ages 45-75 or 85 years old periodically depending on results. Repeat next month  - Cervical cancer screening (pap test) in women between 21-65 years old, periodically depending on results. N/A  - Mammogram screening for breast cancer in women starting at 40-50 years and every 1-2 years. Due soon  - Bone density screening (DEXA) for osteoporosis in women aged 65 years and older, once  every two years if needed. Plan for repeat 2026    IMMUNIZATIONS:  - Flu shot annually.  - Tetanus booster every 10 years. Tdap is due 2027.  - Two pneumococcal vaccinations after 65 years old. Up to date  - Shingles vaccine for those 50 years or older. Due at this time, check pricing at the pharmacy    This was a shared decision making visit.    Next Wellness Exam Due  In 1 year from today      Ayden Yanez,    06/05/24   4:21 PM

## 2024-06-17 ENCOUNTER — APPOINTMENT (OUTPATIENT)
Dept: CARDIOLOGY | Facility: CLINIC | Age: 69
End: 2024-06-17
Payer: MEDICARE

## 2024-07-01 ENCOUNTER — OFFICE VISIT (OUTPATIENT)
Dept: CARDIOLOGY | Facility: CLINIC | Age: 69
End: 2024-07-01
Payer: MEDICARE

## 2024-07-01 VITALS
WEIGHT: 231 LBS | BODY MASS INDEX: 39.65 KG/M2 | SYSTOLIC BLOOD PRESSURE: 138 MMHG | OXYGEN SATURATION: 95 % | DIASTOLIC BLOOD PRESSURE: 84 MMHG | HEART RATE: 67 BPM

## 2024-07-01 DIAGNOSIS — I25.118 ATHEROSCLEROTIC HEART DISEASE OF NATIVE CORONARY ARTERY WITH OTHER FORMS OF ANGINA PECTORIS (CMS-HCC): ICD-10-CM

## 2024-07-01 DIAGNOSIS — R07.9 CHEST PAIN, UNSPECIFIED TYPE: Primary | ICD-10-CM

## 2024-07-01 PROCEDURE — 3079F DIAST BP 80-89 MM HG: CPT | Performed by: INTERNAL MEDICINE

## 2024-07-01 PROCEDURE — 1036F TOBACCO NON-USER: CPT | Performed by: INTERNAL MEDICINE

## 2024-07-01 PROCEDURE — 99214 OFFICE O/P EST MOD 30 MIN: CPT | Performed by: INTERNAL MEDICINE

## 2024-07-01 PROCEDURE — 1159F MED LIST DOCD IN RCRD: CPT | Performed by: INTERNAL MEDICINE

## 2024-07-01 PROCEDURE — 3075F SYST BP GE 130 - 139MM HG: CPT | Performed by: INTERNAL MEDICINE

## 2024-07-01 PROCEDURE — 99204 OFFICE O/P NEW MOD 45 MIN: CPT | Performed by: INTERNAL MEDICINE

## 2024-07-01 PROCEDURE — 3008F BODY MASS INDEX DOCD: CPT | Performed by: INTERNAL MEDICINE

## 2024-07-01 PROCEDURE — 1126F AMNT PAIN NOTED NONE PRSNT: CPT | Performed by: INTERNAL MEDICINE

## 2024-07-01 PROCEDURE — 1123F ACP DISCUSS/DSCN MKR DOCD: CPT | Performed by: INTERNAL MEDICINE

## 2024-07-01 ASSESSMENT — ENCOUNTER SYMPTOMS
LOSS OF SENSATION IN FEET: 0
ENDOCRINE NEGATIVE: 1
CHILLS: 0
EYES NEGATIVE: 1
COUGH: 0
MUSCULOSKELETAL NEGATIVE: 1
FEVER: 0
CONSTITUTIONAL NEGATIVE: 1
GASTROINTESTINAL NEGATIVE: 1
RESPIRATORY NEGATIVE: 1
OCCASIONAL FEELINGS OF UNSTEADINESS: 0
DEPRESSION: 0
NEUROLOGICAL NEGATIVE: 1

## 2024-07-01 ASSESSMENT — PAIN SCALES - GENERAL: PAINLEVEL: 0-NO PAIN

## 2024-07-01 NOTE — PROGRESS NOTES
Breath  88 Subjective      Chief Complaint   Patient presents with    Dr. Yanez ref pt est new cardiologist           This is a 68-year-old white female arrives to see.  She does have a history of atrial fibrillation and has had multiple A-fib ablations at the [facility] as well as cardioversions.  She is now on Tikosyn.  She does have a history of having a stress test done 2 years ago.  She has noticed over the last 3 months that she will get a discomfort in her chest this is more when she exerts herself.  She slows down and it goes away.  She has never had a myocardial infarction and her previous EKGs show a normal sinus rhythm with nonstick ST-T wave changes.  She not complain symptoms of PND orthopnea.  She says she may have had some of the symptoms in the past and that is what necessitated the stress test.  She does have a history of hypercholesterolemia as well as high blood pressure.  She is nondiabetic she is not a smoker is a family history of atrial fibrillation and mitral valve prolapse in the past.  She never had a myocardial infarction.  She never told that she had a heart murmur nor rheumatic fever.  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  She has never had a heart catheterization.  She has also noticed that she has been very fatigued for the past 3 to 6 months.    She was sent here for clearance for colonoscopy.           Review of Systems   Constitutional: Negative. Negative for chills and fever.   HENT: Negative.     Eyes: Negative.    Respiratory: Negative.  Negative for cough.    Endocrine: Negative.    Skin: Negative.    Musculoskeletal: Negative.    Gastrointestinal: Negative.    Genitourinary: Negative.    Neurological: Negative.    All other systems reviewed and are negative.       Past Surgical History:   Procedure Laterality Date    ABLATION OF DYSRHYTHMIC FOCUS      BREAST SURGERY      CHOLECYSTECTOMY      HYSTERECTOMY       OTHER SURGICAL HISTORY  10/08/2019    Knee replacement    OTHER SURGICAL HISTORY  10/08/2019    Knee arthroscopy        Active Ambulatory Problems     Diagnosis Date Noted    Actinic keratosis 06/07/2013    Atherosclerotic heart disease of native coronary artery with other forms of angina pectoris (CMS-HCC) 04/09/2024    Chronic diastolic heart failure (Multi) 04/07/2022    Hypercholesterolemia 11/11/2015    Hypertension 04/09/2024    Persistent atrial fibrillation (Multi) 04/05/2022    Prediabetes 07/26/2023    Primary osteoarthritis of left knee 01/26/2016    Vitamin D deficiency 05/28/2024    Insulin resistance 05/28/2024     Resolved Ambulatory Problems     Diagnosis Date Noted    Gall bladder polyp 04/09/2024    History of colon polyps 04/09/2024    History of total knee replacement 10/21/2019    Liver cyst 10/20/2020    Sludge in gallbladder 08/08/2023     Past Medical History:   Diagnosis Date    Abnormal ECG     Atrial fibrillation (Multi)     Cancer (Multi)     Heart disease     History of radiation therapy         Visit Vitals  /84   Pulse 67   Wt 105 kg (231 lb)   LMP  (LMP Unknown)   SpO2 95%   BMI 39.65 kg/m²   OB Status Hysterectomy   Smoking Status Never   BSA 2.18 m²        Objective     Constitutional:       Appearance: Healthy appearance.   Eyes:      Pupils: Pupils are equal, round, and reactive to light.   Neck:      Vascular: JVD normal.   Pulmonary:      Breath sounds: Normal breath sounds.   Cardiovascular:      PMI at left midclavicular line. Normal rate. Regular rhythm. Normal S1. Normal S2.       Murmurs: There is no murmur.      No gallop.  No click. No rub.   Pulses:     Intact distal pulses.   Edema:     Peripheral edema absent.   Abdominal:      Palpations: Abdomen is soft.      Tenderness: There is no abdominal tenderness.   Musculoskeletal:      Extremities: No clubbing present.Skin:     General: Skin is warm and dry.   Neurological:      General: No focal deficit present.       "      Lab Review:         Lab Results   Component Value Date    CHOL 145 05/07/2024    CHOL 240 (H) 01/02/2020     Lab Results   Component Value Date    HDL 49.0 (L) 05/07/2024    HDL 47.9 01/02/2020     Lab Results   Component Value Date    LDLCALC 73 05/07/2024     Lab Results   Component Value Date    TRIG 116 05/07/2024    TRIG 170 (H) 01/02/2020     No components found for: \"CHOLHDL\"     Assessment/Plan     Atherosclerotic heart disease of native coronary artery with other forms of angina pectoris (CMS-HCC)  This is a 68-year-old white female who does have atrial fibrillation past has had multiple ablations for this.  She does have cardioversions.  She is complaining of chest discomforts these come and go.  Would like to get a stress test and we will do a Cardiolite stress test.  Will see her back for report.  She is to have a colonoscopy and feel this is a low risk procedure and would clear her for the procedure     "

## 2024-07-01 NOTE — ASSESSMENT & PLAN NOTE
This is a 68-year-old white female who does have atrial fibrillation past has had multiple ablations for this.  She does have cardioversions.  She is complaining of chest discomforts these come and go.  Would like to get a stress test and we will do a Cardiolite stress test.  Will see her back for report.  She is to have a colonoscopy and feel this is a low risk procedure and would clear her for the procedure

## 2024-07-18 ENCOUNTER — APPOINTMENT (OUTPATIENT)
Dept: RADIOLOGY | Facility: HOSPITAL | Age: 69
End: 2024-07-18
Payer: MEDICARE

## 2024-07-18 ENCOUNTER — HOSPITAL ENCOUNTER (OUTPATIENT)
Dept: RADIOLOGY | Facility: HOSPITAL | Age: 69
End: 2024-07-18
Payer: MEDICARE

## 2024-07-18 ENCOUNTER — APPOINTMENT (OUTPATIENT)
Dept: CARDIOLOGY | Facility: HOSPITAL | Age: 69
End: 2024-07-18
Payer: MEDICARE

## 2024-07-23 ENCOUNTER — APPOINTMENT (OUTPATIENT)
Dept: CARDIOLOGY | Facility: CLINIC | Age: 69
End: 2024-07-23
Payer: MEDICARE

## 2024-07-31 ENCOUNTER — TELEPHONE (OUTPATIENT)
Dept: RADIATION ONCOLOGY | Facility: HOSPITAL | Age: 69
End: 2024-07-31
Payer: MEDICARE

## 2024-07-31 NOTE — TELEPHONE ENCOUNTER
Called pt to remind of appointment on 8/1/2024 at 11:15 Pt confirmed appointment.     Anthony Hubbard MA

## 2024-08-01 ENCOUNTER — HOSPITAL ENCOUNTER (OUTPATIENT)
Dept: RADIATION ONCOLOGY | Facility: HOSPITAL | Age: 69
Setting detail: RADIATION/ONCOLOGY SERIES
Discharge: HOME | End: 2024-08-01
Payer: MEDICARE

## 2024-08-01 VITALS
TEMPERATURE: 98.2 F | DIASTOLIC BLOOD PRESSURE: 69 MMHG | RESPIRATION RATE: 18 BRPM | SYSTOLIC BLOOD PRESSURE: 155 MMHG | OXYGEN SATURATION: 95 % | HEIGHT: 64 IN | BODY MASS INDEX: 39.34 KG/M2 | HEART RATE: 84 BPM | WEIGHT: 230.4 LBS

## 2024-08-01 DIAGNOSIS — Z79.811 AROMATASE INHIBITOR USE: ICD-10-CM

## 2024-08-01 DIAGNOSIS — D05.12 DUCTAL CARCINOMA IN SITU (DCIS) OF LEFT BREAST: Primary | ICD-10-CM

## 2024-08-01 PROCEDURE — 99213 OFFICE O/P EST LOW 20 MIN: CPT | Performed by: NURSE PRACTITIONER

## 2024-08-01 ASSESSMENT — COLUMBIA-SUICIDE SEVERITY RATING SCALE - C-SSRS
2. HAVE YOU ACTUALLY HAD ANY THOUGHTS OF KILLING YOURSELF?: NO
6. HAVE YOU EVER DONE ANYTHING, STARTED TO DO ANYTHING, OR PREPARED TO DO ANYTHING TO END YOUR LIFE?: NO
1. IN THE PAST MONTH, HAVE YOU WISHED YOU WERE DEAD OR WISHED YOU COULD GO TO SLEEP AND NOT WAKE UP?: NO

## 2024-08-01 ASSESSMENT — ENCOUNTER SYMPTOMS
DEPRESSION: 0
OCCASIONAL FEELINGS OF UNSTEADINESS: 0
LOSS OF SENSATION IN FEET: 0

## 2024-08-01 ASSESSMENT — PAIN SCALES - GENERAL: PAINLEVEL: 0-NO PAIN

## 2024-08-01 NOTE — PROGRESS NOTES
Radiation Oncology Follow-Up    Patient Name:  Betsey Flor  MRN:  [id number]  :  1955    Referring Provider: No ref. provider found  Primary Care Provider: Ayden Yanez DO  Care Team: Patient Care Team:  Ayden Yanez DO as PCP - General (Family Medicine)    Date of Service: 2024        Cancer Staging:          Breast: Ductal Carcinoma in situ         AJCC Edition: 8th (AJCC), Diagnosis Date: Mar 2022, 0, pTis(DCIS) cN0 cM0 G2     Treatment Synopsis:    68-year-old female who underwent a routine screening mammogram on 2021.  This revealed an oval area of focal asymmetry in the lower inner quadrant of the left  breast as well as architectural distortion in the lower inner quadrant of the left breast.  Ultrasound directed at the 9 o'clock position, 3 cm from the nipple revealed a 0.6 x 0.5 x 0.4 cm suspicious mass.  Left breast core biopsy performed 2022  revealed atypical ductal hyperplasia.  On 3/10/2022 she underwent a left partial mastectomy at [facility].  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  The resection margins were negative with the closest  being the anterior margin at 0.2 cm.  Estrogen receptors stained positive at 99%.      Pt transferred care to . Following surgery, she received radiation therapy to the left breast consisting of a total dose of 42.56 Gy given in 16 fractions of 2.66 Gy per fraction, completed on 6/3/2022.     Post treatment endocrine therapy with anastrozole.     SUBJECTIVE  History of Present Illness:   Betsey Flor is here today for routine radiation follow up. She states she is doing well overall and no breast complaints except mild tenderness at times. Skin is intact. No palpable findings on self breast exam. No swelling of left arm or difficulty with ROM. She is taking anastrozole and no adverse effects except for mild pain in some of her joints at times. Denies headaches, fever, chills,  cough, SOB, chest pain, GI or  complaints or bony pain. She had cardiac stress test today. Results pending. Has chronic afib. Energy level is baseline. Mammogram in January without evidence of malignancy.    Review of Systems:    Review of Systems   All other systems reviewed and are negative.    Performance Status:   The Karnofsky performance scale today is 90, Able to carry on normal activity; minor signs or symptoms of disease (ECOG equivalent 0).      OBJECTIVE    Current Outpatient Medications:     anastrozole (Arimidex) 1 mg tablet, Take 1 tablet (1 mg total) by mouth once daily., Disp: , Rfl:     BIOTIN ORAL, Take 1 tablet by mouth early in the morning.., Disp: , Rfl:     CALCIUM ORAL, Take 1 tablet by mouth early in the morning.., Disp: , Rfl:     cholecalciferol (Vitamin D3) 1,250 mcg (50,000 unit) tablet, Take 1 tablet (50,000 Units) by mouth 1 (one) time per week., Disp: 12 tablet, Rfl: 0    colesevelam (Welchol) 625 mg tablet, Take 2 tablets (1,250 mg) by mouth 2 times a day., Disp: , Rfl:     dofetilide (Tikosyn) 250 mcg capsule, Take 2 capsules (500 mcg) by mouth 2 times a day., Disp: , Rfl:     furosemide (Lasix) 20 mg tablet, Take 1 tablet (20 mg) by mouth once daily., Disp: , Rfl:     potassium chloride CR 20 mEq ER tablet, Take 1 tablet (20 mEq) by mouth every other day., Disp: , Rfl:     simvastatin (Zocor) 20 mg tablet, Take 1 tablet (20 mg) by mouth once daily at bedtime., Disp: 90 tablet, Rfl: 1    Xarelto 20 mg tablet, Take 1 tablet (20 mg) by mouth once daily in the evening. Take with meals., Disp: , Rfl:      Physical Exam  Vitals reviewed.   Constitutional:       Appearance: Normal appearance.   HENT:      Head: Normocephalic and atraumatic.      Nose: Nose normal.      Mouth/Throat:      Mouth: Mucous membranes are moist.      Pharynx: Oropharynx is clear.   Eyes:      Conjunctiva/sclera: Conjunctivae normal.      Pupils: Pupils are equal, round, and reactive to light.   Cardiovascular:       Rate and Rhythm: Normal rate.      Heart sounds: Normal heart sounds.      Comments: Afib  Pulmonary:      Effort: Pulmonary effort is normal.      Breath sounds: Normal breath sounds.   Chest:   Breasts:     Right: No swelling, inverted nipple, mass, nipple discharge or skin change.      Left: No swelling, inverted nipple, mass, nipple discharge or skin change.   Abdominal:      Palpations: Abdomen is soft.   Musculoskeletal:         General: No swelling. Normal range of motion.      Cervical back: Normal range of motion and neck supple.   Lymphadenopathy:      Cervical: No cervical adenopathy.      Upper Body:      Right upper body: No supraclavicular or axillary adenopathy.      Left upper body: No supraclavicular or axillary adenopathy.   Skin:     General: Skin is warm and dry.   Neurological:      General: No focal deficit present.      Mental Status: She is alert and oriented to person, place, and time.   Psychiatric:         Mood and Affect: Mood normal.         Behavior: Behavior normal.         RESULTS:  Narrative & Impression   Interpreted By:  Iris Lopes,   STUDY:  BI MAMMO BILATERAL DIAGNOSTIC TOMOSYNTHESIS;  1/4/2024 12:05 pm      ACCESSION NUMBER(S):  [id number]      ORDERING CLINICIAN:  JAYCE SALAZAR      INDICATION:  Signs/Symptoms: breast cancer  anastrozole use  C50.919: Breast  cancer. Left lumpectomy with radiation treatment.      COMPARISON:  Mammograms 06/23/2023, 01/03/2023      FINDINGS:  2D and tomosynthesis images were reviewed at 1 mm slice thickness.      Density:  There are areas of scattered fibroglandular tissue.      Stable postsurgical scarring in the medial central left breast, the  site of lumpectomy. Stable moderate anterior skin thickening left  breast consistent with radiation treatment. No suspicious masses or  calcifications are identified within either breast.      IMPRESSION:  No mammographic evidence of malignancy.  Stable postoperative and  postradiation  changes, left breast.      BI-RADS CATEGORY:      BI-RADS Category:  2 Benign.  Recommendation:  Routine Screening Mammogram in 1 Year.  Recommended Date:  1 Year.  Laterality:  Bilateral.         ASSESSMENT/PLAN:  68 y.o. female with DCIS of left breast s/p breast conserving surgery followed by radiation. Cosmesis is excellent. She will continue anastrozole for planned 5 yrs. Radiation follow up in 12 mo. Call with any concerns.     Mary Jo Davison CNP  [phone]

## 2024-08-07 ENCOUNTER — APPOINTMENT (OUTPATIENT)
Dept: RADIATION ONCOLOGY | Facility: HOSPITAL | Age: 69
End: 2024-08-07
Payer: MEDICARE

## 2024-12-17 DIAGNOSIS — E78.00 HYPERCHOLESTEROLEMIA: ICD-10-CM

## 2024-12-18 RX ORDER — SIMVASTATIN 20 MG/1
20 TABLET, FILM COATED ORAL NIGHTLY
Qty: 90 TABLET | Refills: 0 | Status: SHIPPED | OUTPATIENT
Start: 2024-12-18

## 2025-01-13 ENCOUNTER — APPOINTMENT (OUTPATIENT)
Dept: RADIOLOGY | Facility: CLINIC | Age: 70
End: 2025-01-13
Payer: MEDICARE

## 2025-01-23 ENCOUNTER — APPOINTMENT (OUTPATIENT)
Dept: RADIOLOGY | Facility: CLINIC | Age: 70
End: 2025-01-23
Payer: MEDICARE

## 2025-02-18 ENCOUNTER — HOSPITAL ENCOUNTER (OUTPATIENT)
Dept: RADIOLOGY | Facility: CLINIC | Age: 70
Discharge: HOME | End: 2025-02-18
Payer: MEDICARE

## 2025-02-18 DIAGNOSIS — Z12.31 BREAST CANCER SCREENING BY MAMMOGRAM: ICD-10-CM

## 2025-02-18 PROCEDURE — 77063 BREAST TOMOSYNTHESIS BI: CPT | Performed by: STUDENT IN AN ORGANIZED HEALTH CARE EDUCATION/TRAINING PROGRAM

## 2025-02-18 PROCEDURE — 77067 SCR MAMMO BI INCL CAD: CPT | Performed by: STUDENT IN AN ORGANIZED HEALTH CARE EDUCATION/TRAINING PROGRAM

## 2025-02-18 PROCEDURE — 77067 SCR MAMMO BI INCL CAD: CPT

## 2025-07-30 ENCOUNTER — APPOINTMENT (OUTPATIENT)
Dept: RADIATION ONCOLOGY | Facility: HOSPITAL | Age: 70
End: 2025-07-30
Payer: MEDICARE

## 2025-08-20 ENCOUNTER — APPOINTMENT (OUTPATIENT)
Dept: RADIATION ONCOLOGY | Facility: HOSPITAL | Age: 70
End: 2025-08-20
Payer: MEDICARE